# Patient Record
Sex: FEMALE | Race: WHITE
[De-identification: names, ages, dates, MRNs, and addresses within clinical notes are randomized per-mention and may not be internally consistent; named-entity substitution may affect disease eponyms.]

---

## 2020-09-28 ENCOUNTER — HOSPITAL ENCOUNTER (EMERGENCY)
Dept: HOSPITAL 19 - COL.ER | Age: 25
Discharge: HOME | End: 2020-09-28
Payer: COMMERCIAL

## 2020-09-28 VITALS — TEMPERATURE: 98.1 F

## 2020-09-28 VITALS — HEIGHT: 64.02 IN | WEIGHT: 165.35 LBS | BODY MASS INDEX: 28.23 KG/M2

## 2020-09-28 VITALS — HEART RATE: 82 BPM | DIASTOLIC BLOOD PRESSURE: 78 MMHG | SYSTOLIC BLOOD PRESSURE: 120 MMHG

## 2020-09-28 DIAGNOSIS — R00.2: Primary | ICD-10-CM

## 2020-09-28 LAB
ALBUMIN SERPL-MCNC: 4.9 GM/DL (ref 3.5–5)
ALP SERPL-CCNC: 113 U/L (ref 50–136)
ALT SERPL-CCNC: 17 U/L (ref 4–34)
ANION GAP SERPL CALC-SCNC: 13 MMOL/L (ref 7–16)
AST SERPL-CCNC: 26 U/L (ref 15–37)
BASOPHILS # BLD: 0.1 10*3/UL (ref 0–0.2)
BASOPHILS NFR BLD AUTO: 0.7 % (ref 0–2)
BILIRUB SERPL-MCNC: 0.4 MG/DL (ref 0–1)
BUN SERPL-MCNC: 10 MG/DL (ref 7–17)
CALCIUM SERPL-MCNC: 9.4 MG/DL (ref 8.4–10.2)
CHLORIDE SERPL-SCNC: 105 MMOL/L (ref 98–107)
CO2 SERPL-SCNC: 23 MMOL/L (ref 22–30)
CREAT SERPL-SCNC: 0.66 UMOL/L (ref 0.52–1.25)
EOSINOPHIL # BLD: 0.1 10*3/UL (ref 0–0.7)
EOSINOPHIL NFR BLD: 1.2 % (ref 0–4)
ERYTHROCYTE [DISTWIDTH] IN BLOOD BY AUTOMATED COUNT: 15.2 % (ref 11.5–14.5)
GLUCOSE SERPL-MCNC: 90 MG/DL (ref 74–106)
GRANULOCYTES # BLD AUTO: 69.1 % (ref 42.2–75.2)
HCT VFR BLD AUTO: 39.9 % (ref 37–47)
HGB BLD-MCNC: 12.9 G/DL (ref 12.5–16)
LYMPHOCYTES # BLD: 2.3 10*3/UL (ref 1.2–3.4)
LYMPHOCYTES NFR BLD: 19.7 % (ref 20–51)
MCH RBC QN AUTO: 28 PG (ref 27–31)
MCHC RBC AUTO-ENTMCNC: 32 G/DL (ref 33–37)
MCV RBC AUTO: 88 FL (ref 80–100)
MONOCYTES # BLD: 1 10*3/UL (ref 0.1–0.6)
MONOCYTES NFR BLD AUTO: 8.9 % (ref 1.7–9.3)
NEUTROPHILS # BLD: 8 10*3/UL (ref 1.4–6.5)
PLATELET # BLD AUTO: 539 K/MM3 (ref 130–400)
PMV BLD AUTO: 8.9 FL (ref 7.4–10.4)
POTASSIUM SERPL-SCNC: 3.9 MMOL/L (ref 3.4–5)
PROT SERPL-MCNC: 8.7 GM/DL (ref 6.4–8.2)
RBC # BLD AUTO: 4.54 M/MM3 (ref 4.1–5.3)
SODIUM SERPL-SCNC: 141 MMOL/L (ref 137–145)
TROPONIN I SERPL-MCNC: < 0.012 NG/ML (ref 0–0.04)
TSH SERPL DL<=0.005 MIU/L-ACNC: 1.95 UIU/ML (ref 0.47–4.68)

## 2024-03-28 ENCOUNTER — APPOINTMENT (RX ONLY)
Dept: URBAN - METROPOLITAN AREA CLINIC 166 | Facility: CLINIC | Age: 29
Setting detail: DERMATOLOGY
End: 2024-03-28

## 2024-03-28 DIAGNOSIS — B36.0 PITYRIASIS VERSICOLOR: ICD-10-CM

## 2024-03-28 DIAGNOSIS — L20.89 OTHER ATOPIC DERMATITIS: ICD-10-CM

## 2024-03-28 PROCEDURE — ? PRESCRIPTION

## 2024-03-28 PROCEDURE — ? COUNSELING

## 2024-03-28 PROCEDURE — 99204 OFFICE O/P NEW MOD 45 MIN: CPT

## 2024-03-28 PROCEDURE — ? PRESCRIPTION MEDICATION MANAGEMENT

## 2024-03-28 RX ORDER — KETOCONAZOLE 20 MG/ML
SHAMPOO, SUSPENSION TOPICAL BIW
Qty: 120 | Refills: 11 | Status: ERX | COMMUNITY
Start: 2024-03-28

## 2024-03-28 RX ORDER — TRIAMCINOLONE ACETONIDE 1 MG/G
CREAM TOPICAL BID
Qty: 454 | Refills: 0 | Status: ERX | COMMUNITY
Start: 2024-03-28

## 2024-03-28 RX ORDER — KETOCONAZOLE 20 MG/G
CREAM TOPICAL BID
Qty: 60 | Refills: 11 | Status: ERX | COMMUNITY
Start: 2024-03-28

## 2024-03-28 RX ORDER — FLUCONAZOLE 150 MG/1
TABLET ORAL
Qty: 4 | Refills: 0 | Status: ERX | COMMUNITY
Start: 2024-03-28

## 2024-03-28 RX ADMIN — KETOCONAZOLE: 20 SHAMPOO, SUSPENSION TOPICAL at 00:00

## 2024-03-28 RX ADMIN — TRIAMCINOLONE ACETONIDE: 1 CREAM TOPICAL at 00:00

## 2024-03-28 RX ADMIN — FLUCONAZOLE: 150 TABLET ORAL at 00:00

## 2024-03-28 RX ADMIN — KETOCONAZOLE: 20 CREAM TOPICAL at 00:00

## 2024-03-28 ASSESSMENT — LOCATION ZONE DERM
LOCATION ZONE: HAND
LOCATION ZONE: TRUNK
LOCATION ZONE: NECK

## 2024-03-28 ASSESSMENT — LOCATION SIMPLE DESCRIPTION DERM
LOCATION SIMPLE: LEFT HAND
LOCATION SIMPLE: RIGHT HAND
LOCATION SIMPLE: LEFT ANTERIOR NECK
LOCATION SIMPLE: UPPER BACK
LOCATION SIMPLE: CHEST

## 2024-03-28 ASSESSMENT — SEVERITY ASSESSMENT 2020: SEVERITY 2020: MILD

## 2024-03-28 ASSESSMENT — BSA ECZEMA: % BODY COVERED IN ECZEMA: 5

## 2024-03-28 ASSESSMENT — ITCH NUMERIC RATING SCALE: HOW SEVERE IS YOUR ITCHING?: 1

## 2024-03-28 ASSESSMENT — BSA RASH: BSA RASH: 14

## 2024-03-28 ASSESSMENT — LOCATION DETAILED DESCRIPTION DERM
LOCATION DETAILED: SUPERIOR THORACIC SPINE
LOCATION DETAILED: LEFT RADIAL PALM
LOCATION DETAILED: MIDDLE STERNUM
LOCATION DETAILED: LEFT INFERIOR ANTERIOR NECK
LOCATION DETAILED: RIGHT ULNAR PALM

## 2024-03-28 NOTE — PROCEDURE: PRESCRIPTION MEDICATION MANAGEMENT
Render In Strict Bullet Format?: No
Initiate Treatment: Diflucan 150 mg once a week x 4 weeks.
Detail Level: Zone
Continue Regimen: Washing with ketoconazole shampoo; apply and let sit 10 mins before rinse.\\nApply ketoconazole cream twice daily until clear plus 1 week; then as needed for flares.
Initiate Treatment: Apply triamcinolone twice daily when flared x 2 weeks on 1 week off; then as needed for flares.
Plan: Patient desires to restart Dupixent as it has helped keep her atopic dermatitis clear. She has tried multiple topicals. Dupixent paperwork and medical release form filled out. Advised that we will await previous records prior to submitting Dupixent paperwork.

## 2024-03-28 NOTE — HPI: RASH
Is This A New Presentation, Or A Follow-Up?: Rash
What Type Of Note Output Would You Prefer (Optional)?: Bullet Format
Is The Patient Presenting As Previously Scheduled?: Yes
Additional History: Pt states moved here from [place] and was dx with eczema and did topicals but unsure of names and was on Dupixent but off those since around Nov.\\nAlso states has tinea versicolor and treated with a pill, ketoconazole shampoo and cream. States flared since moving here